# Patient Record
Sex: FEMALE | Race: ASIAN | URBAN - METROPOLITAN AREA
[De-identification: names, ages, dates, MRNs, and addresses within clinical notes are randomized per-mention and may not be internally consistent; named-entity substitution may affect disease eponyms.]

---

## 2023-06-21 ENCOUNTER — WEB ENCOUNTER (OUTPATIENT)
Dept: URBAN - METROPOLITAN AREA CLINIC 86 | Facility: CLINIC | Age: 63
End: 2023-06-21

## 2023-06-21 ENCOUNTER — OFFICE VISIT (OUTPATIENT)
Dept: URBAN - METROPOLITAN AREA CLINIC 86 | Facility: CLINIC | Age: 63
End: 2023-06-21
Payer: COMMERCIAL

## 2023-06-21 ENCOUNTER — LAB OUTSIDE AN ENCOUNTER (OUTPATIENT)
Dept: URBAN - METROPOLITAN AREA CLINIC 86 | Facility: CLINIC | Age: 63
End: 2023-06-21

## 2023-06-21 VITALS
HEART RATE: 67 BPM | DIASTOLIC BLOOD PRESSURE: 76 MMHG | TEMPERATURE: 97.2 F | SYSTOLIC BLOOD PRESSURE: 133 MMHG | HEIGHT: 65 IN | WEIGHT: 146 LBS | BODY MASS INDEX: 24.32 KG/M2

## 2023-06-21 DIAGNOSIS — B18.2 CHRONIC HEPATITIS C WITHOUT HEPATIC COMA: ICD-10-CM

## 2023-06-21 DIAGNOSIS — Z79.899 HIGH RISK MEDICATION USE: ICD-10-CM

## 2023-06-21 DIAGNOSIS — Z71.85 VACCINE COUNSELING: ICD-10-CM

## 2023-06-21 DIAGNOSIS — F32.A DEPRESSION, UNSPECIFIED DEPRESSION TYPE: ICD-10-CM

## 2023-06-21 PROBLEM — 35489007: Status: ACTIVE | Noted: 2023-06-21

## 2023-06-21 PROBLEM — 453861000124107: Status: ACTIVE | Noted: 2023-06-21

## 2023-06-21 PROBLEM — 443913008: Status: ACTIVE | Noted: 2023-06-21

## 2023-06-21 PROBLEM — 128302006: Status: ACTIVE | Noted: 2023-06-21

## 2023-06-21 PROCEDURE — 99214 OFFICE O/P EST MOD 30 MIN: CPT | Performed by: PHYSICIAN ASSISTANT

## 2023-06-21 RX ORDER — ESCITALOPRAM OXALATE 20 MG/1
1 TABLET TABLET ORAL ONCE A DAY
Status: ACTIVE | COMMUNITY

## 2023-06-21 NOTE — HPI-TODAY'S VISIT:
This is a 63-year-old female referred by Dr. Abhilash Mathews for evaluation of hepatitis C. A copy of the note will be sent to referring provider  Per referral note she was diagnosed with this a long time ago and tried medicine in [place] about 4 to 5 years ago. She did not do labs after   She is listed as taking escitalopram. She previously was taking cholesterol medication and now off. She stopped taking this a few months ago.   No other info sent She brought labs  total cholesterol up> 200 and tg up.  5/2023 glucose 108, creatinien 0.57, sodium. bilirubin 0.4, alkaline phosphatase 130, ast 101, alt 200.  Hep b core need to check need to get the [facility] insurance needs rx from us, eval and plan, us, labs,

## 2023-06-22 ENCOUNTER — OFFICE VISIT (OUTPATIENT)
Dept: URBAN - METROPOLITAN AREA CLINIC 95 | Facility: CLINIC | Age: 63
End: 2023-06-22
Payer: COMMERCIAL

## 2023-06-22 DIAGNOSIS — K76.89 LIVER CYST: ICD-10-CM

## 2023-06-22 DIAGNOSIS — K76.0 FATTY LIVER: ICD-10-CM

## 2023-06-22 DIAGNOSIS — B18.2 CHRONIC HEPATITIS C WITHOUT HEPATIC COMA: ICD-10-CM

## 2023-06-22 PROCEDURE — 76705 ECHO EXAM OF ABDOMEN: CPT

## 2023-06-22 PROCEDURE — 93975 VASCULAR STUDY: CPT

## 2023-06-26 ENCOUNTER — TELEPHONE ENCOUNTER (OUTPATIENT)
Dept: URBAN - METROPOLITAN AREA CLINIC 86 | Facility: CLINIC | Age: 63
End: 2023-06-26

## 2023-06-26 NOTE — HPI-TODAY'S VISIT:
Dear Nata Flores,  The ultrasound was sent to me.  The liver echogenicity appears increased and this could be due to fatty liver and also due to the inflammation from the virus.  They saw small cyst measuring 8 mm in the left lobe. Cysts are benign and nothing of concern. No suspicious lesion.  Common bile duct 5 mm.  Gallbladder normal.  They thought the hepatic vasculature was patent.  The right kidney appeared normal.  Overall they did not see any lesions.  And I thought the hepatic vasculature was patent.  We will review at the visit.  Lolis Armstrong PA-C

## 2023-06-26 NOTE — HPI-TODAY'S VISIT:
Dear Nata Flores,  The 6/21/23 labs were sent to me.  You are not immune to hepatitis B.  The surface antibody level was 40.  Glucose 93, creatinine 0.65, sodium 144, potassium 5.0.  Alkaline phosphatase 120, AST 63, ALT 94.  Goal for the AST and ALT is less than 25.  As we treat the virus this level will go down.  Complete blood count within normal limits.  There is no evidence of exposure to hepatitis B.  You are also immune to hepatitis A.  The hepatitis B viral load is 5,760,000.  We will review this at the follow-up.  Lolis Armstrong PA-C

## 2023-06-27 LAB
A/G RATIO: 1.6
ABSOLUTE BASOPHILS: 30
ABSOLUTE EOSINOPHILS: 50
ABSOLUTE LYMPHOCYTES: 1880
ABSOLUTE MONOCYTES: 300
ABSOLUTE NEUTROPHILS: 2740
ALBUMIN: 4.8
ALKALINE PHOSPHATASE: 120
ALT (SGPT): 94
AST (SGOT): 63
BASOPHILS: 0.6
BILIRUBIN, TOTAL: 0.6
BUN/CREATININE RATIO: (no result)
BUN: 13
CALCIUM: 9.5
CARBON DIOXIDE, TOTAL: 19
CHLORIDE: 105
CREATININE: 0.65
EGFR: 99
EOSINOPHILS: 1
GLOBULIN, TOTAL: 3
GLUCOSE: 93
HCV RNA, QUANTITATIVE REAL TIME PCR: (no result)
HCV RNA, QUANTITATIVE REAL TIME PCR: 6.76
HEMATOCRIT: 39.9
HEMOGLOBIN: 13.3
HEPATITIS A AB, TOTAL: REACTIVE
HEPATITIS B CORE AB TOTAL: (no result)
HEPATITIS B SURFACE AB IMMUNITY, QN: 40
HEPATITIS B SURFACE ANTIGEN: (no result)
HEPATITIS C VIRAL RNA: 2
LYMPHOCYTES: 37.6
MCH: 28.9
MCHC: 33.3
MCV: 86.6
MONOCYTES: 6
MPV: 10.9
NEUTROPHILS: 54.8
PLATELET COUNT: 228
POTASSIUM: 5
PROTEIN, TOTAL: 7.8
RDW: 12.5
RED BLOOD CELL COUNT: 4.61
SODIUM: 144
WHITE BLOOD CELL COUNT: 5

## 2023-06-29 ENCOUNTER — TELEPHONE ENCOUNTER (OUTPATIENT)
Dept: URBAN - METROPOLITAN AREA CLINIC 92 | Facility: CLINIC | Age: 63
End: 2023-06-29

## 2023-06-29 ENCOUNTER — OFFICE VISIT (OUTPATIENT)
Dept: URBAN - METROPOLITAN AREA CLINIC 86 | Facility: CLINIC | Age: 63
End: 2023-06-29
Payer: COMMERCIAL

## 2023-06-29 VITALS — WEIGHT: 145 LBS | BODY MASS INDEX: 24.16 KG/M2 | HEIGHT: 65 IN

## 2023-06-29 DIAGNOSIS — Z71.85 VACCINE COUNSELING: ICD-10-CM

## 2023-06-29 DIAGNOSIS — Z78.9 HEPATITIS B CORE ANTIBODY NEGATIVE: ICD-10-CM

## 2023-06-29 DIAGNOSIS — F32.A DEPRESSION, UNSPECIFIED DEPRESSION TYPE: ICD-10-CM

## 2023-06-29 DIAGNOSIS — K76.89 HEPATIC CYST: ICD-10-CM

## 2023-06-29 DIAGNOSIS — B18.2 CHRONIC HEPATITIS C WITHOUT HEPATIC COMA: ICD-10-CM

## 2023-06-29 DIAGNOSIS — Z79.899 HIGH RISK MEDICATION USE: ICD-10-CM

## 2023-06-29 PROBLEM — 736693005: Status: ACTIVE | Noted: 2023-06-29

## 2023-06-29 PROBLEM — 85057007: Status: ACTIVE | Noted: 2023-06-29

## 2023-06-29 PROCEDURE — 99443 PHONE E/M BY PHYS 21-30 MIN: CPT | Performed by: PHYSICIAN ASSISTANT

## 2023-06-29 RX ORDER — VELPATASVIR AND SOFOSBUVIR 100; 400 MG/1; MG/1
1 TABLET TABLET, FILM COATED ORAL ONCE A DAY
Qty: 84 TABLET | Refills: 0 | OUTPATIENT
Start: 2023-06-29 | End: 2023-09-21

## 2023-06-29 RX ORDER — ESCITALOPRAM OXALATE 20 MG/1
1 TABLET TABLET ORAL ONCE A DAY
Status: ACTIVE | COMMUNITY

## 2023-06-29 NOTE — HPI-TODAY'S VISIT:
This is a 63-year-old female referred by Dr. Abhilash Mathews for evaluation of hepatitis C. A copy of the note will be sent to referring provider   6/29/23 telemed The ultrasound was sent to me.  The liver echogenicity appears increased and this could be due to fatty liver and also due to the inflammation from the virus.  They saw small cyst measuring 8 mm in the left lobe. Cysts are benign and nothing of concern. No suspicious lesion.  Common bile duct 5 mm.  Gallbladder normal.  They thought the hepatic vasculature was patent.  The right kidney appeared normal.  Overall they did not see any lesions.  And I thought the hepatic vasculature was patent.    The 6/21/23 labs were sent to me.  You are immune to hepatitis B.  The surface antibody level was 40.  Glucose 93, creatinine 0.65, sodium 144, potassium 5.0.  Alkaline phosphatase 120, AST 63, ALT 94.  Goal for the AST and ALT is less than 25.  As we treat the virus this level will go down.  Complete blood count within normal limits.  There is no evidence of exposure to hepatitis B.  You are also immune to hepatitis A.  The hepatitis B viral load is 5,760,000.  We will review this at the follow-up.  DIscussed the information and the medication and the epclusa preferable. Discussed importance of adhearance to tx and no missed doses and risk She previously was taking  60mg daclatasvir dihydrochloride. This was sometime between 3248-8984 in [place]. suspect this is why it did not clear as not as affective.     recap Per referral note she was diagnosed with this a long time ago and tried medicine in [place] about 4 to 5 years ago. She did not do labs after   She is listed as taking escitalopram. She previously was taking cholesterol medication and now off. She stopped taking this a few months ago.   No other info sent She brought labs  total cholesterol up> 200 and tg up.  5/2023 glucose 108, creatinien 0.57, sodium. bilirubin 0.4, alkaline phosphatase 130, ast 101, alt 200.  Hep b core need to check need to get the [facility] insurance needs rx from [facility], eval and plan, us, labs,

## 2023-07-03 ENCOUNTER — TELEPHONE ENCOUNTER (OUTPATIENT)
Dept: URBAN - METROPOLITAN AREA CLINIC 92 | Facility: CLINIC | Age: 63
End: 2023-07-03

## 2023-07-10 ENCOUNTER — TELEPHONE ENCOUNTER (OUTPATIENT)
Dept: URBAN - METROPOLITAN AREA CLINIC 92 | Facility: CLINIC | Age: 63
End: 2023-07-10

## 2023-07-11 ENCOUNTER — TELEPHONE ENCOUNTER (OUTPATIENT)
Dept: URBAN - METROPOLITAN AREA CLINIC 86 | Facility: CLINIC | Age: 63
End: 2023-07-11

## 2023-07-13 ENCOUNTER — TELEPHONE ENCOUNTER (OUTPATIENT)
Dept: URBAN - METROPOLITAN AREA CLINIC 92 | Facility: CLINIC | Age: 63
End: 2023-07-13

## 2023-07-17 ENCOUNTER — TELEPHONE ENCOUNTER (OUTPATIENT)
Dept: URBAN - METROPOLITAN AREA CLINIC 92 | Facility: CLINIC | Age: 63
End: 2023-07-17

## 2023-07-22 ENCOUNTER — WEB ENCOUNTER (OUTPATIENT)
Dept: URBAN - METROPOLITAN AREA CLINIC 86 | Facility: CLINIC | Age: 63
End: 2023-07-22

## 2023-07-25 ENCOUNTER — TELEPHONE ENCOUNTER (OUTPATIENT)
Dept: URBAN - METROPOLITAN AREA CLINIC 92 | Facility: CLINIC | Age: 63
End: 2023-07-25

## 2023-07-27 ENCOUNTER — LAB OUTSIDE AN ENCOUNTER (OUTPATIENT)
Dept: URBAN - METROPOLITAN AREA CLINIC 86 | Facility: CLINIC | Age: 63
End: 2023-07-27

## 2023-07-27 ENCOUNTER — TELEPHONE ENCOUNTER (OUTPATIENT)
Dept: URBAN - METROPOLITAN AREA CLINIC 92 | Facility: CLINIC | Age: 63
End: 2023-07-27

## 2023-08-09 ENCOUNTER — WEB ENCOUNTER (OUTPATIENT)
Dept: URBAN - METROPOLITAN AREA CLINIC 86 | Facility: CLINIC | Age: 63
End: 2023-08-09

## 2023-08-10 ENCOUNTER — LAB OUTSIDE AN ENCOUNTER (OUTPATIENT)
Dept: URBAN - METROPOLITAN AREA CLINIC 86 | Facility: CLINIC | Age: 63
End: 2023-08-10

## 2023-08-14 ENCOUNTER — WEB ENCOUNTER (OUTPATIENT)
Dept: URBAN - METROPOLITAN AREA CLINIC 86 | Facility: CLINIC | Age: 63
End: 2023-08-14

## 2023-08-29 ENCOUNTER — LAB OUTSIDE AN ENCOUNTER (OUTPATIENT)
Dept: URBAN - METROPOLITAN AREA CLINIC 86 | Facility: CLINIC | Age: 63
End: 2023-08-29

## 2023-08-30 ENCOUNTER — LAB OUTSIDE AN ENCOUNTER (OUTPATIENT)
Dept: URBAN - METROPOLITAN AREA CLINIC 86 | Facility: CLINIC | Age: 63
End: 2023-08-30

## 2023-09-01 ENCOUNTER — TELEPHONE ENCOUNTER (OUTPATIENT)
Dept: URBAN - METROPOLITAN AREA CLINIC 86 | Facility: CLINIC | Age: 63
End: 2023-09-01

## 2023-09-01 LAB
A/G RATIO: 1.9
ALBUMIN: 4.8
ALKALINE PHOSPHATASE: 98
ALT (SGPT): 16
AST (SGOT): 20
BASO (ABSOLUTE): 0
BASOS: 1
BILIRUBIN, TOTAL: 0.6
BUN/CREATININE RATIO: 22
BUN: 14
CALCIUM: 9.7
CARBON DIOXIDE, TOTAL: 25
CHLORIDE: 101
CREATININE: 0.64
EGFR: 99
EOS (ABSOLUTE): 0
EOS: 1
GLOBULIN, TOTAL: 2.5
GLUCOSE: 106
HCV LOG10: (no result)
HEMATOCRIT: 42
HEMATOLOGY COMMENTS:: (no result)
HEMOGLOBIN: 13.8
HEPATITIS C QUANTITATION: <15
IMMATURE CELLS: (no result)
IMMATURE GRANS (ABS): 0
IMMATURE GRANULOCYTES: 0
LYMPHS (ABSOLUTE): 2.2
LYMPHS: 34
Lab: (no result)
MCH: 28.9
MCHC: 32.9
MCV: 88
MONOCYTES(ABSOLUTE): 0.4
MONOCYTES: 6
NEUTROPHILS (ABSOLUTE): 3.8
NEUTROPHILS: 58
NRBC: (no result)
PLATELETS: 216
POTASSIUM: 5.4
PROTEIN, TOTAL: 7.3
RBC: 4.78
RDW: 12.9
SODIUM: 142
TEST INFORMATION:: (no result)
WBC: 6.4

## 2023-09-01 NOTE — HPI-TODAY'S VISIT:
Dear Nata Flores,  8/30/23 labs were sent to me.  White blood cell 6.4, hemoglobin 13.8, MCV 88, platelets 216 and these are all normal.  Glucose slightly elevated at 106 if you were fasting.  Creatinine 0.64, sodium 142, potassium 5.4 and sometimes this can happen as an lab error but I would recommend seeing the primary care next week and having them checked this again.  The bilirubin 0.6, alkaline phosphatase 98, AST 20, ALT 16.  There is no evidence of hepatitis C which is good to note.  Still need to continue to take the medications as we have discussed previously.  Happy to see this!  I hope you enjoy your weekend.  Lolis Armstrong PA-C

## 2023-09-13 ENCOUNTER — LAB OUTSIDE AN ENCOUNTER (OUTPATIENT)
Dept: URBAN - METROPOLITAN AREA CLINIC 86 | Facility: CLINIC | Age: 63
End: 2023-09-13

## 2023-09-16 LAB
A/G RATIO: 2
ALBUMIN: 4.7
ALKALINE PHOSPHATASE: 96
ALT (SGPT): 16
AST (SGOT): 19
BASO (ABSOLUTE): 0
BASOS: 1
BILIRUBIN, TOTAL: 0.5
BUN/CREATININE RATIO: 19
BUN: 12
CALCIUM: 9.8
CARBON DIOXIDE, TOTAL: 24
CHLORIDE: 103
CREATININE: 0.62
EGFR: 100
EOS (ABSOLUTE): 0
EOS: 1
GLOBULIN, TOTAL: 2.4
GLUCOSE: 106
HCV LOG10: (no result)
HEMATOCRIT: 40.3
HEMATOLOGY COMMENTS:: (no result)
HEMOGLOBIN: 13.3
HEPATITIS C QUANTITATION: (no result)
IMMATURE CELLS: (no result)
IMMATURE GRANS (ABS): 0
IMMATURE GRANULOCYTES: 0
LYMPHS (ABSOLUTE): 2.2
LYMPHS: 41
MCH: 29.1
MCHC: 33
MCV: 88
MONOCYTES(ABSOLUTE): 0.4
MONOCYTES: 7
NEUTROPHILS (ABSOLUTE): 2.8
NEUTROPHILS: 50
NRBC: (no result)
PLATELETS: 233
POTASSIUM: 4.5
PROTEIN, TOTAL: 7.1
RBC: 4.57
RDW: 13.1
SODIUM: 141
TEST INFORMATION:: (no result)
WBC: 5.4

## 2023-09-18 ENCOUNTER — TELEPHONE ENCOUNTER (OUTPATIENT)
Dept: URBAN - METROPOLITAN AREA CLINIC 86 | Facility: CLINIC | Age: 63
End: 2023-09-18

## 2023-09-18 NOTE — HPI-TODAY'S VISIT:
Dear Nata Flores,  The 9/13/2023 labs were sent to me.  White blood cells 5.4, hemoglobin 13.3, MCV 88, platelets 233.  The glucose 106 which is elevated if you are fasting.  Please share with the primary care.  Creatinine 0.6, sodium 141, potassium 4.5, bilirubin 0.5, alkaline phosphatase 96, AST 19, ALT 16.  The hepatitis C virus is not detected which is good to see.  Still need to continue and finish the medication for the full 12 weeks.  Happy to see this.  Lolis Armstrong PA-C

## 2023-09-27 ENCOUNTER — LAB OUTSIDE AN ENCOUNTER (OUTPATIENT)
Dept: URBAN - METROPOLITAN AREA CLINIC 86 | Facility: CLINIC | Age: 63
End: 2023-09-27

## 2023-09-30 LAB
A/G RATIO: 2.3
ALBUMIN: 4.8
ALKALINE PHOSPHATASE: 96
ALT (SGPT): 15
AST (SGOT): 22
BASO (ABSOLUTE): 0
BASOS: 1
BILIRUBIN, TOTAL: 0.5
BUN/CREATININE RATIO: 22
BUN: 13
CALCIUM: 9.6
CARBON DIOXIDE, TOTAL: 26
CHLORIDE: 102
CREATININE: 0.6
EGFR: 101
EOS (ABSOLUTE): 0
EOS: 1
GLOBULIN, TOTAL: 2.1
GLUCOSE: 103
HCV LOG10: (no result)
HEMATOCRIT: 42
HEMATOLOGY COMMENTS:: (no result)
HEMOGLOBIN: 13.8
HEPATITIS C QUANTITATION: (no result)
IMMATURE CELLS: (no result)
IMMATURE GRANS (ABS): 0
IMMATURE GRANULOCYTES: 0
LYMPHS (ABSOLUTE): 1.8
LYMPHS: 35
MCH: 29.3
MCHC: 32.9
MCV: 89
MONOCYTES(ABSOLUTE): 0.3
MONOCYTES: 5
NEUTROPHILS (ABSOLUTE): 3
NEUTROPHILS: 58
NRBC: (no result)
PLATELETS: 221
POTASSIUM: 4.8
PROTEIN, TOTAL: 6.9
RBC: 4.71
RDW: 13.1
SODIUM: 139
TEST INFORMATION:: (no result)
WBC: 5.2

## 2023-10-02 ENCOUNTER — TELEPHONE ENCOUNTER (OUTPATIENT)
Dept: URBAN - METROPOLITAN AREA CLINIC 86 | Facility: CLINIC | Age: 63
End: 2023-10-02

## 2023-10-02 NOTE — HPI-TODAY'S VISIT:
Dear Nata Flores,   The 9/27/23 labs were sent to me. As always thanks for doing the labs.  White blood cell 5.2, red blood cells 4.7, hemoglobin 13.8, MCV 89, platelets 221.  These are normal.  Glucose 103 which is slightly elevated if you are fasting the goal is less than 100.  Creatinine 0.6, sodium 139, potassium 4.8, bilirubin 0.5, alkaline phosphatase 96, AST 22, ALT 15.  The hepatitis C virus remains undetected!   Lolis Armstrong PA-C

## 2023-10-05 ENCOUNTER — OFFICE VISIT (OUTPATIENT)
Dept: URBAN - METROPOLITAN AREA CLINIC 86 | Facility: CLINIC | Age: 63
End: 2023-10-05
Payer: COMMERCIAL

## 2023-10-05 VITALS
BODY MASS INDEX: 24.24 KG/M2 | SYSTOLIC BLOOD PRESSURE: 151 MMHG | DIASTOLIC BLOOD PRESSURE: 83 MMHG | TEMPERATURE: 96.9 F | WEIGHT: 145.5 LBS | HEIGHT: 65 IN | HEART RATE: 64 BPM

## 2023-10-05 DIAGNOSIS — F32.A DEPRESSION, UNSPECIFIED DEPRESSION TYPE: ICD-10-CM

## 2023-10-05 DIAGNOSIS — Z79.899 HIGH RISK MEDICATION USE: ICD-10-CM

## 2023-10-05 DIAGNOSIS — B18.2 CHRONIC HEPATITIS C WITHOUT HEPATIC COMA: ICD-10-CM

## 2023-10-05 DIAGNOSIS — K76.89 HEPATIC CYST: ICD-10-CM

## 2023-10-05 DIAGNOSIS — Z78.9 HEPATITIS B CORE ANTIBODY NEGATIVE: ICD-10-CM

## 2023-10-05 DIAGNOSIS — Z71.85 VACCINE COUNSELING: ICD-10-CM

## 2023-10-05 PROCEDURE — 99214 OFFICE O/P EST MOD 30 MIN: CPT | Performed by: PHYSICIAN ASSISTANT

## 2023-10-05 RX ORDER — ESCITALOPRAM OXALATE 20 MG/1
1 TABLET TABLET ORAL ONCE A DAY
Status: ON HOLD | COMMUNITY

## 2023-10-05 RX ORDER — VELPATASVIR AND SOFOSBUVIR 100; 400 MG/1; MG/1
1 TABLET TABLET, FILM COATED ORAL ONCE A DAY
Status: ACTIVE | COMMUNITY

## 2023-10-05 NOTE — HPI-TODAY'S VISIT:
This is a 63-year-old female referred by Dr. Abhilash Mathews for evaluation of hepatitis C. A copy of the note will be sent to referring provider   10/5/23 ov  The 9/27/23 labs were sent to me. As always thanks for doing the labs.  White blood cell 5.2, red blood cells 4.7, hemoglobin 13.8, MCV 89, platelets 221.  These are normal.  Glucose 103 which is slightly elevated if you are fasting the goal is less than 100.  Creatinine 0.6, sodium 139, potassium 4.8, bilirubin 0.5, alkaline phosphatase 96, AST 22, ALT 15.  The hepatitis C virus remains undetected!   The 9/13/2023 labs were sent to me.  White blood cells 5.4, hemoglobin 13.3, MCV 88, platelets 233.  The glucose 106 which is elevated if you are fasting.  Please share with the primary care.  Creatinine 0.6, sodium 141, potassium 4.5, bilirubin 0.5, alkaline phosphatase 96, AST 19, ALT 16.  The hepatitis C virus is not detected which is good to see.  Still need to continue and finish the medication for the full 12 weeks.  Happy to see this.  she is doing well on the medication, has about 31 days left no missed doses labs looking good   recap 6/29/23 telemed The ultrasound was sent to me.  The liver echogenicity appears increased and this could be due to fatty liver and also due to the inflammation from the virus.  They saw small cyst measuring 8 mm in the left lobe. Cysts are benign and nothing of concern. No suspicious lesion.  Common bile duct 5 mm.  Gallbladder normal.  They thought the hepatic vasculature was patent.  The right kidney appeared normal.  Overall they did not see any lesions.  And I thought the hepatic vasculature was patent.    The 6/21/23 labs were sent to me.  You are immune to hepatitis B.  The surface antibody level was 40.  Glucose 93, creatinine 0.65, sodium 144, potassium 5.0.  Alkaline phosphatase 120, AST 63, ALT 94.  Goal for the AST and ALT is less than 25.  As we treat the virus this level will go down.  Complete blood count within normal limits.  There is no evidence of exposure to hepatitis B.  You are also immune to hepatitis A.  The hepatitis B viral load is 5,760,000.  We will review this at the follow-up.  DIscussed the information and the medication and the epclusa preferable. Discussed importance of adhearance to tx and no missed doses and risk She previously was taking  60mg daclatasvir dihydrochloride. This was sometime between 1441-3185 in [place]. suspect this is why it did not clear as not as affective.    Per referral note she was diagnosed with this a long time ago and tried medicine in [place] about 4 to 5 years ago. She did not do labs after   She is listed as taking escitalopram. She previously was taking cholesterol medication and now off. She stopped taking this a few months ago.   No other info sent She brought labs  total cholesterol up> 200 and tg up.  5/2023 glucose 108, creatinien 0.57, sodium. bilirubin 0.4, alkaline phosphatase 130, ast 101, alt 200.  Hep b core need to check need to get the [facility] insurance needs rx from [facility], [place] and plan, us, labs,

## 2023-10-16 ENCOUNTER — TELEPHONE ENCOUNTER (OUTPATIENT)
Dept: URBAN - METROPOLITAN AREA CLINIC 86 | Facility: CLINIC | Age: 63
End: 2023-10-16

## 2023-11-03 ENCOUNTER — LAB OUTSIDE AN ENCOUNTER (OUTPATIENT)
Dept: URBAN - METROPOLITAN AREA CLINIC 86 | Facility: CLINIC | Age: 63
End: 2023-11-03

## 2023-11-06 LAB
A/G RATIO: 2
ALBUMIN: 4.7
ALKALINE PHOSPHATASE: 105
ALT (SGPT): 13
AST (SGOT): 20
BASO (ABSOLUTE): 0
BASOS: 1
BILIRUBIN, TOTAL: 0.4
BUN/CREATININE RATIO: 18
BUN: 11
CALCIUM: 9.8
CARBON DIOXIDE, TOTAL: 28
CHLORIDE: 104
CREATININE: 0.6
EGFR: 101
EOS (ABSOLUTE): 0
EOS: 1
GLOBULIN, TOTAL: 2.4
GLUCOSE: 92
HCV LOG10: (no result)
HEMATOCRIT: 41.6
HEMATOLOGY COMMENTS:: (no result)
HEMOGLOBIN: 13.6
HEPATITIS C QUANTITATION: (no result)
IMMATURE CELLS: (no result)
IMMATURE GRANS (ABS): 0
IMMATURE GRANULOCYTES: 0
LYMPHS (ABSOLUTE): 2
LYMPHS: 35
MCH: 29.6
MCHC: 32.7
MCV: 90
MONOCYTES(ABSOLUTE): 0.3
MONOCYTES: 5
NEUTROPHILS (ABSOLUTE): 3.4
NEUTROPHILS: 58
NRBC: (no result)
PLATELETS: 230
POTASSIUM: 4.6
PROTEIN, TOTAL: 7.1
RBC: 4.6
RDW: 13
SODIUM: 142
TEST INFORMATION:: (no result)
WBC: 5.8

## 2023-11-09 ENCOUNTER — TELEPHONE ENCOUNTER (OUTPATIENT)
Dept: URBAN - METROPOLITAN AREA CLINIC 86 | Facility: CLINIC | Age: 63
End: 2023-11-09

## 2023-11-09 NOTE — HPI-TODAY'S VISIT:
Dear Nata Flores,   The 11/3/23 labs were sent to me.  The white blood cells 5.8, hemoglobin 13.6, MCV 90, platelets 230 and these values are normal.  Creatinine 0.6, sodium 142, potassium 4.6, bilirubin 0.4, alkaline phosphatase 105, AST 20, ALT 13.  The goal for the AST and ALT is less than 25 and happy to see that you are there.  The hepatitis C remains undetected which is good to see.  We will see what the labs look like at the 1 month off treatment iris and you should have those orders. Let us know if there are issues.  Lolis Armstrong PA-C

## 2023-12-05 ENCOUNTER — LAB OUTSIDE AN ENCOUNTER (OUTPATIENT)
Dept: URBAN - METROPOLITAN AREA CLINIC 86 | Facility: CLINIC | Age: 63
End: 2023-12-05

## 2023-12-07 LAB
A/G RATIO: 2.1
ALBUMIN: 4.8
ALKALINE PHOSPHATASE: 91
ALT (SGPT): 15
AST (SGOT): 18
BASO (ABSOLUTE): 0
BASOS: 1
BILIRUBIN, TOTAL: 0.4
BUN/CREATININE RATIO: 22
BUN: 14
CALCIUM: 9.5
CARBON DIOXIDE, TOTAL: 25
CHLORIDE: 105
CREATININE: 0.63
EGFR: 100
EOS (ABSOLUTE): 0.1
EOS: 1
GLOBULIN, TOTAL: 2.3
GLUCOSE: 98
HCV LOG10: (no result)
HEMATOCRIT: 39.6
HEMATOLOGY COMMENTS:: (no result)
HEMOGLOBIN: 13
HEPATITIS C QUANTITATION: (no result)
IMMATURE CELLS: (no result)
IMMATURE GRANS (ABS): 0
IMMATURE GRANULOCYTES: 0
LYMPHS (ABSOLUTE): 2
LYMPHS: 32
MCH: 29.5
MCHC: 32.8
MCV: 90
MONOCYTES(ABSOLUTE): 0.4
MONOCYTES: 6
NEUTROPHILS (ABSOLUTE): 3.8
NEUTROPHILS: 60
NRBC: (no result)
PLATELETS: 199
POTASSIUM: 4.3
PROTEIN, TOTAL: 7.1
RBC: 4.4
RDW: 12.7
SODIUM: 144
TEST INFORMATION:: (no result)
WBC: 6.2

## 2023-12-12 ENCOUNTER — TELEPHONE ENCOUNTER (OUTPATIENT)
Dept: URBAN - METROPOLITAN AREA CLINIC 86 | Facility: CLINIC | Age: 63
End: 2023-12-12

## 2023-12-12 ENCOUNTER — WEB ENCOUNTER (OUTPATIENT)
Dept: URBAN - METROPOLITAN AREA CLINIC 86 | Facility: CLINIC | Age: 63
End: 2023-12-12

## 2023-12-12 NOTE — HPI-TODAY'S VISIT:
Dear Nata Flores,   The recent labs were sent to me dated December 5, 2023.  The hepatitis C virus is not detected.  The creatinine 0.63, bilirubin 0.4, alkaline phosphatase 91, AST 18, ALT 15.  These are normal.  The complete blood count showing white blood cell 6.2, hemoglobin 13, MCV 90, platelets 199.  These are also normal. Hope you are doing well. Happy Holidays.  Lolis Armstrong PA-C

## 2023-12-21 ENCOUNTER — OFFICE VISIT (OUTPATIENT)
Dept: URBAN - METROPOLITAN AREA CLINIC 91 | Facility: CLINIC | Age: 63
End: 2023-12-21
Payer: COMMERCIAL

## 2023-12-21 ENCOUNTER — OFFICE VISIT (OUTPATIENT)
Dept: URBAN - METROPOLITAN AREA CLINIC 86 | Facility: CLINIC | Age: 63
End: 2023-12-21
Payer: COMMERCIAL

## 2023-12-21 ENCOUNTER — DASHBOARD ENCOUNTERS (OUTPATIENT)
Age: 63
End: 2023-12-21

## 2023-12-21 VITALS
HEIGHT: 65 IN | TEMPERATURE: 97.4 F | HEART RATE: 79 BPM | WEIGHT: 141 LBS | BODY MASS INDEX: 23.49 KG/M2 | DIASTOLIC BLOOD PRESSURE: 71 MMHG | SYSTOLIC BLOOD PRESSURE: 120 MMHG

## 2023-12-21 DIAGNOSIS — K76.89 HEPATIC CYST: ICD-10-CM

## 2023-12-21 DIAGNOSIS — B18.2 CHRONIC HEPATITIS C WITHOUT HEPATIC COMA: ICD-10-CM

## 2023-12-21 DIAGNOSIS — Z78.9 HEPATITIS B CORE ANTIBODY NEGATIVE: ICD-10-CM

## 2023-12-21 DIAGNOSIS — Z79.899 HIGH RISK MEDICATION USE: ICD-10-CM

## 2023-12-21 DIAGNOSIS — Z71.85 VACCINE COUNSELING: ICD-10-CM

## 2023-12-21 DIAGNOSIS — K76.89 LIVER CYST: ICD-10-CM

## 2023-12-21 DIAGNOSIS — F32.A DEPRESSION, UNSPECIFIED DEPRESSION TYPE: ICD-10-CM

## 2023-12-21 PROCEDURE — 99214 OFFICE O/P EST MOD 30 MIN: CPT | Performed by: PHYSICIAN ASSISTANT

## 2023-12-21 PROCEDURE — 93975 VASCULAR STUDY: CPT

## 2023-12-21 PROCEDURE — 76705 ECHO EXAM OF ABDOMEN: CPT

## 2023-12-21 RX ORDER — VELPATASVIR AND SOFOSBUVIR 100; 400 MG/1; MG/1
1 TABLET TABLET, FILM COATED ORAL ONCE A DAY
Status: ACTIVE | COMMUNITY

## 2023-12-21 RX ORDER — ESCITALOPRAM OXALATE 20 MG/1
1 TABLET TABLET ORAL ONCE A DAY
Status: ON HOLD | COMMUNITY

## 2023-12-21 NOTE — HPI-TODAY'S VISIT:
This is a 63-year-old female referred by Dr. Abhilash Mathews for evaluation of hepatitis C. A copy of the note will be sent to referring provider   12/21/23 December 5, 2023. The hepatitis C virus is not detected. The creatinine 0.63, bilirubin 0.4, alkaline phosphatase 91, AST 18, ALT 15. These are normal. The complete blood count showing white blood cell 6.2, hemoglobin 13, MCV 90, platelets 199. These are also normal. Hope you are doing well. Happy Holidays.  Lolis Armstrong PA-C   The 11/3/23 labs were sent to me. The white blood cells 5.8, hemoglobin 13.6, MCV 90, platelets 230 and these values are normal. Creatinine 0.6, sodium 142, potassium 4.6, bilirubin 0.4, alkaline phosphatase 105, AST 20, ALT 13. The goal for the AST and ALT is less than 25 and happy to see that you are there. The hepatitis C remains undetected which is good to see. We will see what the labs look like at the 1 month off treatment iris and you should have those orders. Let us know if there are issues.  Lolis Armstrong PA-C   recap 10/5/23 ov  The 9/27/23 labs were sent to me. As always thanks for doing the labs.  White blood cell 5.2, red blood cells 4.7, hemoglobin 13.8, MCV 89, platelets 221.  These are normal.  Glucose 103 which is slightly elevated if you are fasting the goal is less than 100.  Creatinine 0.6, sodium 139, potassium 4.8, bilirubin 0.5, alkaline phosphatase 96, AST 22, ALT 15.  The hepatitis C virus remains undetected!   The 9/13/2023 labs were sent to me.  White blood cells 5.4, hemoglobin 13.3, MCV 88, platelets 233.  The glucose 106 which is elevated if you are fasting.  Please share with the primary care.  Creatinine 0.6, sodium 141, potassium 4.5, bilirubin 0.5, alkaline phosphatase 96, AST 19, ALT 16.  The hepatitis C virus is not detected which is good to see.  Still need to continue and finish the medication for the full 12 weeks.  Happy to see this.  she is doing well on the medication, has about 31 days left no missed doses labs looking good   recap 6/29/23 telemed The ultrasound was sent to me.  The liver echogenicity appears increased and this could be due to fatty liver and also due to the inflammation from the virus.  They saw small cyst measuring 8 mm in the left lobe. Cysts are benign and nothing of concern. No suspicious lesion.  Common bile duct 5 mm.  Gallbladder normal.  They thought the hepatic vasculature was patent.  The right kidney appeared normal.  Overall they did not see any lesions.  And I thought the hepatic vasculature was patent.    The 6/21/23 labs were sent to me.  You are immune to hepatitis B.  The surface antibody level was 40.  Glucose 93, creatinine 0.65, sodium 144, potassium 5.0.  Alkaline phosphatase 120, AST 63, ALT 94.  Goal for the AST and ALT is less than 25.  As we treat the virus this level will go down.  Complete blood count within normal limits.  There is no evidence of exposure to hepatitis B.  You are also immune to hepatitis A.  The hepatitis B viral load is 5,760,000.  We will review this at the follow-up.  DIscussed the information and the medication and the epclusa preferable. Discussed importance of adhearance to tx and no missed doses and risk She previously was taking  60mg daclatasvir dihydrochloride. This was sometime between 1837-3169 in [place]. suspect this is why it did not clear as not as affective.    Per referral note she was diagnosed with this a long time ago and tried medicine in [place] about 4 to 5 years ago. She did not do labs after   She is listed as taking escitalopram. She previously was taking cholesterol medication and now off. She stopped taking this a few months ago.   No other info sent She brought labs  total cholesterol up> 200 and tg up.  5/2023 glucose 108, creatinien 0.57, sodium. bilirubin 0.4, alkaline phosphatase 130, ast 101, alt 200.  Hep b core need to check need to get the [facility] insurance needs rx from [place], eval and plan, us, labs,

## 2023-12-25 ENCOUNTER — LAB OUTSIDE AN ENCOUNTER (OUTPATIENT)
Dept: URBAN - METROPOLITAN AREA CLINIC 86 | Facility: CLINIC | Age: 63
End: 2023-12-25

## 2023-12-26 ENCOUNTER — TELEPHONE ENCOUNTER (OUTPATIENT)
Dept: URBAN - METROPOLITAN AREA CLINIC 86 | Facility: CLINIC | Age: 63
End: 2023-12-26

## 2023-12-26 NOTE — HPI-TODAY'S VISIT:
Dear Nata Flores,   The 12/21/23 ultrasound was sent to me. The liver appeared normal and they noted there was a stable 8 mm cyst. The gallbladder normal and common duct 3 mm, normal. The hepatic vasculature patent. The spleen 10.1 cm and appears normal. Overall they noted a stable 8mm cyst. We will see what the next scan shows. It was nice seeing you the other day. Happy new year! Lolis Armstrong PA-C

## 2024-03-21 ENCOUNTER — LAB (OUTPATIENT)
Dept: URBAN - METROPOLITAN AREA CLINIC 86 | Facility: CLINIC | Age: 64
End: 2024-03-21

## 2024-03-25 LAB
A/G RATIO: 1.8
ALBUMIN: 4.6
ALKALINE PHOSPHATASE: 103
ALT (SGPT): 11
AST (SGOT): 18
BASO (ABSOLUTE): 0
BASOS: 0
BILIRUBIN, TOTAL: 0.4
BUN/CREATININE RATIO: 26
BUN: 18
CALCIUM: 9.5
CARBON DIOXIDE, TOTAL: 27
CHLORIDE: 102
CREATININE: 0.69
EGFR: 97
EOS (ABSOLUTE): 0
EOS: 0
GLOBULIN, TOTAL: 2.5
GLUCOSE: 191
HCV LOG10: (no result)
HEMATOCRIT: 42.5
HEMATOLOGY COMMENTS:: (no result)
HEMOGLOBIN: 13.8
HEPATITIS C QUANTITATION: (no result)
IMMATURE CELLS: (no result)
IMMATURE GRANS (ABS): 0
IMMATURE GRANULOCYTES: 0
LYMPHS (ABSOLUTE): 2.2
LYMPHS: 33
MCH: 29.3
MCHC: 32.5
MCV: 90
MONOCYTES(ABSOLUTE): 0.3
MONOCYTES: 4
NEUTROPHILS (ABSOLUTE): 4.2
NEUTROPHILS: 63
NRBC: (no result)
PLATELETS: 283
POTASSIUM: 4.3
PROTEIN, TOTAL: 7.1
RBC: 4.71
RDW: 12.6
SODIUM: 143
TEST INFORMATION:: (no result)
WBC: 6.7

## 2024-07-08 ENCOUNTER — LAB OUTSIDE AN ENCOUNTER (OUTPATIENT)
Dept: URBAN - METROPOLITAN AREA CLINIC 86 | Facility: CLINIC | Age: 64
End: 2024-07-08

## 2024-07-10 ENCOUNTER — TELEPHONE ENCOUNTER (OUTPATIENT)
Dept: URBAN - METROPOLITAN AREA CLINIC 86 | Facility: CLINIC | Age: 64
End: 2024-07-10

## 2024-07-10 LAB
ALBUMIN: 4.3
ALKALINE PHOSPHATASE: 105
ALT (SGPT): 14
AST (SGOT): 19
BASO (ABSOLUTE): 0
BASOS: 0
BILIRUBIN, TOTAL: 0.2
BUN/CREATININE RATIO: 38
BUN: 20
CALCIUM: 9.1
CARBON DIOXIDE, TOTAL: 27
CHLORIDE: 104
CREATININE: 0.53
EGFR: 103
EOS (ABSOLUTE): 0
EOS: 1
GLOBULIN, TOTAL: 2.2
GLUCOSE: 116
HCV LOG10: (no result)
HEMATOCRIT: 39.4
HEMATOLOGY COMMENTS:: (no result)
HEMOGLOBIN: 12.8
HEPATITIS C QUANTITATION: (no result)
IMMATURE CELLS: (no result)
IMMATURE GRANS (ABS): 0
IMMATURE GRANULOCYTES: 0
LYMPHS (ABSOLUTE): 1.4
LYMPHS: 26
MCH: 28.5
MCHC: 32.5
MCV: 88
MONOCYTES(ABSOLUTE): 0.3
MONOCYTES: 6
NEUTROPHILS (ABSOLUTE): 3.7
NEUTROPHILS: 67
NRBC: (no result)
PLATELETS: 210
POTASSIUM: 4.8
PROTEIN, TOTAL: 6.5
RBC: 4.49
RDW: 12.5
SODIUM: 143
TEST INFORMATION:: (no result)
WBC: 5.5

## 2024-07-10 NOTE — HPI-TODAY'S VISIT:
Dear Nata Flores,  July 8 hcv pcr not detected and good to see.  Glucose 116 little up and prior 191.  Bun 20 and cr 0.53 and na 143 and k 4.8 and cl 104 and co2 27 and ca 9.1 and albumin 4.3 and TB 0.2 and alk 105 and ast 19 and alt 14.   Prior ast 11 and alt 97.  Wbc 5.5 and hg 12.8 and hct 39.4 and mcv 88 and platelets 210. Neutrophils 3.7 and lymphs 1.4.  Good to see labs stable and await imaging.  Dr Foote

## 2024-07-23 ENCOUNTER — LAB OUTSIDE AN ENCOUNTER (OUTPATIENT)
Dept: URBAN - METROPOLITAN AREA CLINIC 86 | Facility: CLINIC | Age: 64
End: 2024-07-23

## 2024-07-25 ENCOUNTER — OFFICE VISIT (OUTPATIENT)
Dept: URBAN - METROPOLITAN AREA CLINIC 86 | Facility: CLINIC | Age: 64
End: 2024-07-25
Payer: COMMERCIAL

## 2024-07-25 ENCOUNTER — OFFICE VISIT (OUTPATIENT)
Dept: URBAN - METROPOLITAN AREA CLINIC 91 | Facility: CLINIC | Age: 64
End: 2024-07-25
Payer: COMMERCIAL

## 2024-07-25 VITALS
HEART RATE: 64 BPM | WEIGHT: 138 LBS | SYSTOLIC BLOOD PRESSURE: 162 MMHG | BODY MASS INDEX: 22.99 KG/M2 | HEIGHT: 65 IN | TEMPERATURE: 96.9 F | DIASTOLIC BLOOD PRESSURE: 82 MMHG

## 2024-07-25 DIAGNOSIS — B18.2 CHRONIC HEPATITIS C WITHOUT HEPATIC COMA: ICD-10-CM

## 2024-07-25 DIAGNOSIS — K76.89 HEPATIC CYST: ICD-10-CM

## 2024-07-25 DIAGNOSIS — Z78.9 HEPATITIS B CORE ANTIBODY NEGATIVE: ICD-10-CM

## 2024-07-25 DIAGNOSIS — K76.89 LIVER CYST: ICD-10-CM

## 2024-07-25 PROCEDURE — 99214 OFFICE O/P EST MOD 30 MIN: CPT | Performed by: PHYSICIAN ASSISTANT

## 2024-07-25 PROCEDURE — 76705 ECHO EXAM OF ABDOMEN: CPT

## 2024-07-25 PROCEDURE — 93975 VASCULAR STUDY: CPT

## 2024-07-25 RX ORDER — ESCITALOPRAM OXALATE 20 MG/1
1 TABLET TABLET ORAL ONCE A DAY
Status: ON HOLD | COMMUNITY

## 2024-07-25 NOTE — HPI-TODAY'S VISIT:
This is a 64-year-old female referred by Dr. Abhilash Mathews for evaluation of hepatitis C. A copy of the note will be sent to referring provider  7/25/24 ov   July 8 hcv pcr not detected and good to see.  Glucose 116 little up and prior 191.  Bun 20 and cr 0.53 and na 143 and k 4.8 and cl 104 and co2 27 and ca 9.1 and albumin 4.3 and TB 0.2 and alk 105 and ast 19 and alt 14.   Prior ast 11 and alt 97.  Wbc 5.5 and hg 12.8 and hct 39.4 and mcv 88 and platelets 210. Neutrophils 3.7 and lymphs 1.4.  Good to see labs stable and await imaging.  She did the ultrasound today and preliminary report seems stable and follow.  She is not on any new meds other than the cholesterol denies any new dx since last seen will follow up on final us and plan to repeat in 6 months with labs if all is well  recap The 3/22/24 labs were sent to me. The white blood cell 6.7, hemoglobin 13.8, MCV 90, platelets 283 and this was normal. The glucose is 191 which is elevated if you are fasting. Even if you are not fasting this is kind of high and I would recommend sharing that with your primary care. Creatinine 0.69, sodium 143, potassium 4.3, bilirubin 0.4, alkaline phosphatase 103, AST 18, ALT 11. The hepatitis C virus remains undetected. Happy to see this. You should have an ultrasound set up for July and a follow-up after and we will see how you are doing. Happy to see the labs are staying normal.   The 12/21/23 ultrasound was sent to me. The liver appeared normal and they noted there was a stable 8 mm cyst. The gallbladder normal and common duct 3 mm, normal. The hepatic vasculature patent. The spleen 10.1 cm and appears normal. Overall they noted a stable 8mm cyst. We will see what the next scan shows. It was nice seeing you the other day. Happy new year! Lolis Armstrong PA-C   12/21/23 December 5, 2023. The hepatitis C virus is not detected. The creatinine 0.63, bilirubin 0.4, alkaline phosphatase 91, AST 18, ALT 15. These are normal. The complete blood count showing white blood cell 6.2, hemoglobin 13, MCV 90, platelets 199. These are also normal. Hope you are doing well. Happy Holidays.  Lolis Armstrong PA-C   The 11/3/23 labs were sent to me. The white blood cells 5.8, hemoglobin 13.6, MCV 90, platelets 230 and these values are normal. Creatinine 0.6, sodium 142, potassium 4.6, bilirubin 0.4, alkaline phosphatase 105, AST 20, ALT 13. The goal for the AST and ALT is less than 25 and happy to see that you are there. The hepatitis C remains undetected which is good to see. We will see what the labs look like at the 1 month off treatment iris and you should have those orders. Let us know if there are issues.  Lolis Armstrong PA-C   recap 10/5/23 ov  The 9/27/23 labs were sent to me. As always thanks for doing the labs.  White blood cell 5.2, red blood cells 4.7, hemoglobin 13.8, MCV 89, platelets 221.  These are normal.  Glucose 103 which is slightly elevated if you are fasting the goal is less than 100.  Creatinine 0.6, sodium 139, potassium 4.8, bilirubin 0.5, alkaline phosphatase 96, AST 22, ALT 15.  The hepatitis C virus remains undetected!   The 9/13/2023 labs were sent to me.  White blood cells 5.4, hemoglobin 13.3, MCV 88, platelets 233.  The glucose 106 which is elevated if you are fasting.  Please share with the primary care.  Creatinine 0.6, sodium 141, potassium 4.5, bilirubin 0.5, alkaline phosphatase 96, AST 19, ALT 16.  The hepatitis C virus is not detected which is good to see.  Still need to continue and finish the medication for the full 12 weeks.  Happy to see this.  she is doing well on the medication, has about 31 days left no missed doses labs looking good   recap 6/29/23 telemed The ultrasound was sent to me.  The liver echogenicity appears increased and this could be due to fatty liver and also due to the inflammation from the virus.  They saw small cyst measuring 8 mm in the left lobe. Cysts are benign and nothing of concern. No suspicious lesion.  Common bile duct 5 mm.  Gallbladder normal.  They thought the hepatic vasculature was patent.  The right kidney appeared normal.  Overall they did not see any lesions.  And I thought the hepatic vasculature was patent.    The 6/21/23 labs were sent to me.  You are immune to hepatitis B.  The surface antibody level was 40.  Glucose 93, creatinine 0.65, sodium 144, potassium 5.0.  Alkaline phosphatase 120, AST 63, ALT 94.  Goal for the AST and ALT is less than 25.  As we treat the virus this level will go down.  Complete blood count within normal limits.  There is no evidence of exposure to hepatitis B.  You are also immune to hepatitis A.  The hepatitis B viral load is 5,760,000.  We will review this at the follow-up.  DIscussed the information and the medication and the epclusa preferable. Discussed importance of adhearance to tx and no missed doses and risk She previously was taking  60mg daclatasvir dihydrochloride. This was sometime between 3012-3012 in [place]. suspect this is why it did not clear as not as affective.    Per referral note she was diagnosed with this a long time ago and tried medicine in [place] about 4 to 5 years ago. She did not do labs after   She is listed as taking escitalopram. She previously was taking cholesterol medication and now off. She stopped taking this a few months ago.   No other info sent She brought labs  total cholesterol up> 200 and tg up.  5/2023 glucose 108, creatinien 0.57, sodium. bilirubin 0.4, alkaline phosphatase 130, ast 101, alt 200.  Hep b core need to check need to get the [facility] insurance needs rx from [place], [place] and plan, us, labs,

## 2024-07-31 ENCOUNTER — WEB ENCOUNTER (OUTPATIENT)
Dept: URBAN - METROPOLITAN AREA CLINIC 86 | Facility: CLINIC | Age: 64
End: 2024-07-31

## 2024-07-31 ENCOUNTER — TELEPHONE ENCOUNTER (OUTPATIENT)
Dept: URBAN - METROPOLITAN AREA CLINIC 86 | Facility: CLINIC | Age: 64
End: 2024-07-31

## 2024-07-31 NOTE — HPI-TODAY'S VISIT:
Dear Nata Flores,   The 7/25/24 ultrasound was sent to me.The liver appeared normal in contour and normal in echogenicity.  They saw a 1.2 x 0.9 cm and a 0.6 x 0.6 cm simple cyst.  They did not see any suspicious lesions.  Gallbladder appeared normal and the common duct 3.2 mm and this is normal.  Right kidney appeared normal.  Spleen 10.2 cm and this is normal.  Hepatic vascular appeared patent.  Overall they did not see any evidence of cirrhosis but they did see a simple cyst that are benign.  We will continue to monitor this.  It was good seeing you.  Lolis Armstrong PA-C

## 2025-01-06 ENCOUNTER — LAB OUTSIDE AN ENCOUNTER (OUTPATIENT)
Dept: URBAN - METROPOLITAN AREA CLINIC 86 | Facility: CLINIC | Age: 65
End: 2025-01-06

## 2025-01-08 ENCOUNTER — TELEPHONE ENCOUNTER (OUTPATIENT)
Dept: URBAN - METROPOLITAN AREA CLINIC 86 | Facility: CLINIC | Age: 65
End: 2025-01-08

## 2025-01-08 LAB
ALBUMIN: 4.8
ALKALINE PHOSPHATASE: 106
ALT (SGPT): 26
AST (SGOT): 24
BASO (ABSOLUTE): 0
BASOS: 0
BILIRUBIN, TOTAL: 0.8
BUN/CREATININE RATIO: 22
BUN: 12
CALCIUM: 9.4
CARBON DIOXIDE, TOTAL: 26
CHLORIDE: 104
CREATININE: 0.54
EGFR: 103
EOS (ABSOLUTE): 0
EOS: 1
GLOBULIN, TOTAL: 2.1
GLUCOSE: 90
HCV LOG10: (no result)
HEMATOCRIT: 41.2
HEMATOLOGY COMMENTS:: (no result)
HEMOGLOBIN: 13.4
HEPATITIS C QUANTITATION: (no result)
IMMATURE CELLS: (no result)
IMMATURE GRANS (ABS): 0
IMMATURE GRANULOCYTES: 0
LYMPHS (ABSOLUTE): 2.2
LYMPHS: 37
MCH: 29.4
MCHC: 32.5
MCV: 90
MONOCYTES(ABSOLUTE): 0.4
MONOCYTES: 6
NEUTROPHILS (ABSOLUTE): 3.4
NEUTROPHILS: 56
NRBC: (no result)
PLATELETS: 230
POTASSIUM: 4.8
PROTEIN, TOTAL: 6.9
RBC: 4.56
RDW: 13
SODIUM: 143
TEST INFORMATION:: (no result)
WBC: 6

## 2025-01-08 NOTE — HPI-TODAY'S VISIT:
Dear Nata So,  I hope you are doing well! The recent labs were sent to us.  Hepatitis C was not detected.  The complete blood count shows that the glucose 90, creatinine 0.54, sodium 143 and potassium 4.8.  Bilirubin 0.8, alkaline phosphatase 106, AST 24 and ALT 26.  Goal for the AST and ALT is less than 25.  Previously was 19 and 14 so slightly higher now.  Any diet changes?  Could be due to the holidays and more desserts etc.  White blood cell 6.8, hemoglobin 13.4, MCV 90 and platelets 230 and this is normal. Lolis Armstrong PA-C

## 2025-01-21 ENCOUNTER — OFFICE VISIT (OUTPATIENT)
Dept: URBAN - METROPOLITAN AREA CLINIC 91 | Facility: CLINIC | Age: 65
End: 2025-01-21
Payer: COMMERCIAL

## 2025-01-21 ENCOUNTER — OFFICE VISIT (OUTPATIENT)
Dept: URBAN - METROPOLITAN AREA CLINIC 86 | Facility: CLINIC | Age: 65
End: 2025-01-21
Payer: COMMERCIAL

## 2025-01-21 VITALS
BODY MASS INDEX: 24.16 KG/M2 | DIASTOLIC BLOOD PRESSURE: 84 MMHG | TEMPERATURE: 97.9 F | HEIGHT: 65 IN | SYSTOLIC BLOOD PRESSURE: 136 MMHG | WEIGHT: 145 LBS | HEART RATE: 65 BPM

## 2025-01-21 DIAGNOSIS — Z71.85 VACCINE COUNSELING: ICD-10-CM

## 2025-01-21 DIAGNOSIS — Z79.899 HIGH RISK MEDICATION USE: ICD-10-CM

## 2025-01-21 DIAGNOSIS — K76.89 HEPATIC CYST: ICD-10-CM

## 2025-01-21 DIAGNOSIS — B18.2 CHRONIC HEPATITIS C WITHOUT HEPATIC COMA: ICD-10-CM

## 2025-01-21 DIAGNOSIS — Z78.9 HEPATITIS B CORE ANTIBODY NEGATIVE: ICD-10-CM

## 2025-01-21 DIAGNOSIS — F32.A DEPRESSION, UNSPECIFIED DEPRESSION TYPE: ICD-10-CM

## 2025-01-21 PROCEDURE — 93975 VASCULAR STUDY: CPT

## 2025-01-21 PROCEDURE — 76705 ECHO EXAM OF ABDOMEN: CPT

## 2025-01-21 PROCEDURE — 99214 OFFICE O/P EST MOD 30 MIN: CPT | Performed by: PHYSICIAN ASSISTANT

## 2025-01-21 RX ORDER — ESCITALOPRAM OXALATE 20 MG/1
1 TABLET TABLET ORAL ONCE A DAY
Status: ON HOLD | COMMUNITY

## 2025-01-21 NOTE — HPI-TODAY'S VISIT:
This is a 64-year-old female referred by Dr. Abhilash Mathews for evaluation of hepatitis C. A copy of the note will be sent to referring provider   1/21/25 Hepatitis C was not detected. The complete blood count shows that the glucose 90, creatinine 0.54, sodium 143 and potassium 4.8. Bilirubin 0.8, alkaline phosphatase 106, AST 24 and ALT 26. Goal for the AST and ALT is less than 25. Previously was 19 and 14 so slightly higher now. Any diet changes? Could be due to the holidays and more desserts etc. White blood cell 6.8, hemoglobin 13.4, MCV 90 and platelets 230 and this is normal.  she had the us and stable  weight is up and she will work on this     recap The 7/25/24 ultrasound was sent to me.The liver appeared normal in contour and normal in echogenicity.  They saw a 1.2 x 0.9 cm and a 0.6 x 0.6 cm simple cyst.  They did not see any suspicious lesions.  Gallbladder appeared normal and the common duct 3.2 mm and this is normal.  Right kidney appeared normal.  Spleen 10.2 cm and this is normal.  Hepatic vascular appeared patent.  Overall they did not see any evidence of cirrhosis but they did see a simple cyst that are benign.  We will continue to monitor this.  It was good seeing you.  Lolis Armstrong PA-C   7/25/24 ov  July 8 hcv pcr not detected and good to see.  Glucose 116 little up and prior 191.  Bun 20 and cr 0.53 and na 143 and k 4.8 and cl 104 and co2 27 and ca 9.1 and albumin 4.3 and TB 0.2 and alk 105 and ast 19 and alt 14.   Prior ast 11 and alt 97.  Wbc 5.5 and hg 12.8 and hct 39.4 and mcv 88 and platelets 210. Neutrophils 3.7 and lymphs 1.4.  Good to see labs stable and await imaging.  She did the ultrasound today and preliminary report seems stable and follow.  She is not on any new meds other than the cholesterol denies any new dx since last seen will follow up on final us and plan to repeat in 6 months with labs if all is well   The 3/22/24 labs were sent to me. The white blood cell 6.7, hemoglobin 13.8, MCV 90, platelets 283 and this was normal. The glucose is 191 which is elevated if you are fasting. Even if you are not fasting this is kind of high and I would recommend sharing that with your primary care. Creatinine 0.69, sodium 143, potassium 4.3, bilirubin 0.4, alkaline phosphatase 103, AST 18, ALT 11. The hepatitis C virus remains undetected. Happy to see this. You should have an ultrasound set up for July and a follow-up after and we will see how you are doing. Happy to see the labs are staying normal.  The 12/21/23 ultrasound was sent to me. The liver appeared normal and they noted there was a stable 8 mm cyst. The gallbladder normal and common duct 3 mm, normal. The hepatic vasculature patent. The spleen 10.1 cm and appears normal. Overall they noted a stable 8mm cyst. We will see what the next scan shows. It was nice seeing you the other day. Happy new year! Lolis Armstrong PA-C   12/21/23 December 5, 2023. The hepatitis C virus is not detected. The creatinine 0.63, bilirubin 0.4, alkaline phosphatase 91, AST 18, ALT 15. These are normal. The complete blood count showing white blood cell 6.2, hemoglobin 13, MCV 90, platelets 199. These are also normal. Hope you are doing well. Happy Holidays.  Lolis Armstrong PA-C   The 11/3/23 labs were sent to me. The white blood cells 5.8, hemoglobin 13.6, MCV 90, platelets 230 and these values are normal. Creatinine 0.6, sodium 142, potassium 4.6, bilirubin 0.4, alkaline phosphatase 105, AST 20, ALT 13. The goal for the AST and ALT is less than 25 and happy to see that you are there. The hepatitis C remains undetected which is good to see. We will see what the labs look like at the 1 month off treatment iris and you should have those orders. Let us know if there are issues.  Lolis Armstrong PA-C   10/5/23 ov  The 9/27/23 labs were sent to me. As always thanks for doing the labs.  White blood cell 5.2, red blood cells 4.7, hemoglobin 13.8, MCV 89, platelets 221.  These are normal.  Glucose 103 which is slightly elevated if you are fasting the goal is less than 100.  Creatinine 0.6, sodium 139, potassium 4.8, bilirubin 0.5, alkaline phosphatase 96, AST 22, ALT 15.  The hepatitis C virus remains undetected!   The 9/13/2023 labs were sent to me.  White blood cells 5.4, hemoglobin 13.3, MCV 88, platelets 233.  The glucose 106 which is elevated if you are fasting.  Please share with the primary care.  Creatinine 0.6, sodium 141, potassium 4.5, bilirubin 0.5, alkaline phosphatase 96, AST 19, ALT 16.  The hepatitis C virus is not detected which is good to see.  Still need to continue and finish the medication for the full 12 weeks.  Happy to see this.  she is doing well on the medication, has about 31 days left no missed doses labs looking good   recap 6/29/23 telemed The ultrasound was sent to me.  The liver echogenicity appears increased and this could be due to fatty liver and also due to the inflammation from the virus.  They saw small cyst measuring 8 mm in the left lobe. Cysts are benign and nothing of concern. No suspicious lesion.  Common bile duct 5 mm.  Gallbladder normal.  They thought the hepatic vasculature was patent.  The right kidney appeared normal.  Overall they did not see any lesions.  And I thought the hepatic vasculature was patent.    The 6/21/23 labs were sent to me.  You are immune to hepatitis B.  The surface antibody level was 40.  Glucose 93, creatinine 0.65, sodium 144, potassium 5.0.  Alkaline phosphatase 120, AST 63, ALT 94.  Goal for the AST and ALT is less than 25.  As we treat the virus this level will go down.  Complete blood count within normal limits.  There is no evidence of exposure to hepatitis B.  You are also immune to hepatitis A.  The hepatitis B viral load is 5,760,000.  We will review this at the follow-up.  DIscussed the information and the medication and the epclusa preferable. Discussed importance of adhearance to tx and no missed doses and risk She previously was taking  60mg daclatasvir dihydrochloride. This was sometime between 7400-5101 in [place]. suspect this is why it did not clear as not as affective.    Per referral note she was diagnosed with this a long time ago and tried medicine in [place] about 4 to 5 years ago. She did not do labs after   She is listed as taking escitalopram. She previously was taking cholesterol medication and now off. She stopped taking this a few months ago.   No other info sent She brought labs  total cholesterol up> 200 and tg up.  5/2023 glucose 108, creatinien 0.57, sodium. bilirubin 0.4, alkaline phosphatase 130, ast 101, alt 200.  Hep b core need to check need to get the [employer] [place] needs rx from [place], eval and plan, us, labs,

## 2025-01-22 ENCOUNTER — TELEPHONE ENCOUNTER (OUTPATIENT)
Dept: URBAN - METROPOLITAN AREA CLINIC 86 | Facility: CLINIC | Age: 65
End: 2025-01-22

## 2025-01-22 NOTE — HPI-TODAY'S VISIT:
Dear Nata So,  It was good seeing you the other day.  The final ultrasound reading is back.  Overall the liver are normal.  They did see the cyst that you have but they were stable.  Everything was getting good blood flow.  The gallbladder was normal and common bile duct normal as well.  The right kidney also normal. Lolis Armstrong PA-C

## 2025-01-25 ENCOUNTER — LAB OUTSIDE AN ENCOUNTER (OUTPATIENT)
Dept: URBAN - METROPOLITAN AREA CLINIC 86 | Facility: CLINIC | Age: 65
End: 2025-01-25

## 2025-07-01 ENCOUNTER — LAB OUTSIDE AN ENCOUNTER (OUTPATIENT)
Dept: URBAN - METROPOLITAN AREA CLINIC 86 | Facility: CLINIC | Age: 65
End: 2025-07-01

## 2025-07-07 ENCOUNTER — LAB OUTSIDE AN ENCOUNTER (OUTPATIENT)
Dept: URBAN - METROPOLITAN AREA CLINIC 86 | Facility: CLINIC | Age: 65
End: 2025-07-07

## 2025-07-07 ENCOUNTER — OFFICE VISIT (OUTPATIENT)
Dept: URBAN - METROPOLITAN AREA CLINIC 86 | Facility: CLINIC | Age: 65
End: 2025-07-07

## 2025-07-07 ENCOUNTER — OFFICE VISIT (OUTPATIENT)
Dept: URBAN - METROPOLITAN AREA CLINIC 91 | Facility: CLINIC | Age: 65
End: 2025-07-07

## 2025-07-11 ENCOUNTER — OFFICE VISIT (OUTPATIENT)
Dept: URBAN - METROPOLITAN AREA CLINIC 86 | Facility: CLINIC | Age: 65
End: 2025-07-11

## 2025-07-11 ENCOUNTER — OFFICE VISIT (OUTPATIENT)
Dept: URBAN - METROPOLITAN AREA CLINIC 91 | Facility: CLINIC | Age: 65
End: 2025-07-11